# Patient Record
Sex: MALE | Race: WHITE | Employment: STUDENT | URBAN - NONMETROPOLITAN AREA
[De-identification: names, ages, dates, MRNs, and addresses within clinical notes are randomized per-mention and may not be internally consistent; named-entity substitution may affect disease eponyms.]

---
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
## 2025-07-24 ENCOUNTER — TELEPHONE (OUTPATIENT)
Dept: FAMILY MEDICINE CLINIC | Age: 20
End: 2025-07-24

## 2025-07-24 DIAGNOSIS — T17.208A FOREIGN BODY IN PHARYNX, INITIAL ENCOUNTER: Primary | ICD-10-CM

## 2025-07-24 NOTE — TELEPHONE ENCOUNTER
Pts mother calling stating about a week ago, he had a toothbrush bristle come loose and states the pt swallowed it. The patient states this is stuck in his throat and he has been unable to get it out. States they took him to urgent care  and they told him he needs a referral to urgent care. Mom asking if you are able to put in a referral to an ENT? States they prefer the ENT in [place] at [facility]. Please advise.     Call mom at [phone]